# Patient Record
Sex: MALE | Race: WHITE | NOT HISPANIC OR LATINO | URBAN - METROPOLITAN AREA
[De-identification: names, ages, dates, MRNs, and addresses within clinical notes are randomized per-mention and may not be internally consistent; named-entity substitution may affect disease eponyms.]

---

## 2017-11-22 VITALS
OXYGEN SATURATION: 99 % | DIASTOLIC BLOOD PRESSURE: 72 MMHG | TEMPERATURE: 98 F | SYSTOLIC BLOOD PRESSURE: 103 MMHG | HEART RATE: 73 BPM | RESPIRATION RATE: 16 BRPM

## 2017-11-22 RX ORDER — ATORVASTATIN CALCIUM 80 MG/1
1 TABLET, FILM COATED ORAL
Qty: 0 | Refills: 0 | COMMUNITY

## 2017-11-22 RX ORDER — LIRAGLUTIDE 6 MG/ML
1.8 INJECTION SUBCUTANEOUS
Qty: 0 | Refills: 0 | COMMUNITY

## 2017-11-22 RX ORDER — INSULIN GLARGINE 100 [IU]/ML
0 INJECTION, SOLUTION SUBCUTANEOUS
Qty: 0 | Refills: 0 | COMMUNITY

## 2017-11-22 RX ORDER — LIRAGLUTIDE 6 MG/ML
0 INJECTION SUBCUTANEOUS
Qty: 0 | Refills: 0 | COMMUNITY

## 2017-11-22 NOTE — H&P ADULT - NSHPSOCIALHISTORY_GEN_ALL_CORE
FORMER SMOKER  Drinks alcohol socially. Denies elicit drug use. FORMER SMOKER (smoked 1-2 PPD for 30 years, quit in 1996)  Drinks alcohol socially. Denies elicit drug use.

## 2017-11-22 NOTE — H&P ADULT - PMH
Coronary artery disease involving native coronary artery of native heart without angina pectoris    Diabetes    Hypertension, unspecified type

## 2017-11-22 NOTE — H&P ADULT - ASSESSMENT
56 yo male former smoker with strong FHx and PMHx of known obstructive CAD s/p MI (in 1996 s/p angioplasty), s/p PCI (stent x2 to LAD, LCx-pt with stent cards- unable to obtain report as in archives) , HTN, NIDDM presented to Cardiologist Dr. Rosales for routine EST which was abnormal now referred for cardiac catheterization with possible intervention to r/o progressive CAD.     ASA II, Mallampati II  ASA 325mg and Plavix 600mg given pre-procedure  Risks & benefits of procedure and alternative therapy have been explained to the patient including but not limited to: allergic reaction, bleeding w/possible need for blood transfusion, infection, renal and vascular compromise, limb damage, arrhythmia, stroke, vessel dissection/perforation, Myocardial infarction, emergent CABG. Informed consent obtained and in chart.

## 2017-11-22 NOTE — H&P ADULT - PSH
History of cataract extraction, unspecified laterality  B/L in 2014  Presence of stent in coronary artery  x2 in 2003

## 2017-11-22 NOTE — H&P ADULT - HISTORY OF PRESENT ILLNESS
58 yo male former smoker with PMHx known obstructive CAD s/p MI (10/16/2003) with STENT, HTN, DM    EST on 11/14/17 revealed motion of inferior wall was hypokinetic at rest and improved after stress, motion of apical wall was normal at rest and hypokinetic after rest, motion of distal septal wall was normal at rest and hypokinetic after exercise, estimated baseline EF was 45%, EF decreased with stress, size of LV cavity increased with stress, mild to moderate amount of ischemia in the distributed of the LAD, mild MR at rest. **PATIENT TO BRING STENT CARDS    56 yo male former smoker with strong FHx and PMHx of known obstructive CAD s/p MI (in 1996 s/p angioplasty), s/p PCI x2 to LAD, LCx per pt report at [facility] in 10/16/2003 (unable to obtain report as in archives) , HTN, NIDDM presented to Cardiologist Dr. Rosales for routine EST on 11/14/17.   EST on 11/14/17 revealed 1.5 mm downsloping ST depression deyond baseline which resolved 6 minutes into recovery consistent with ischemia. Stress ECHO 11/14/17 revealed motion of inferior wall was hypokinetic at rest and improved after stress, motion of apical wall was normal at rest and hypokinetic after rest, motion of distal septal wall was normal at rest and hypokinetic after exercise, estimated baseline EF was 45%, EF decreased with stress, size of LV cavity increased with stress, mild to moderate amount of ischemia in the distributed of the LAD, mild MR at rest.  Denies SOB, dizziness, diaphoresis, palpitations, fatigue, LE edema, orthopnea, PND, syncope.     Due to pts risk factors, strong FHx, abnormal EST and stress ECHO, pt is referred for cardiac catheterization w/ possible intervention to r/o progressive CAD. **PATIENT TO BRING STENT CARDS    58 yo male former smoker with strong FHx and PMHx of known obstructive CAD s/p MI (in 1996 s/p angioplasty), s/p PCI (stent x2 to LAD, LCx per pt report at [facility] in 10/16/2003, unable to obtain report as in archives) , HTN, NIDDM presented to Cardiologist Dr. Rosales for routine EST on 11/14/17.   EST on 11/14/17 revealed 1.5 mm downsloping ST depression deyond baseline which resolved 6 minutes into recovery consistent with ischemia. Stress ECHO 11/14/17 revealed motion of inferior wall was hypokinetic at rest and improved after stress, motion of apical wall was normal at rest and hypokinetic after rest, motion of distal septal wall was normal at rest and hypokinetic after exercise, estimated baseline EF was 45%, EF decreased with stress, size of LV cavity increased with stress, mild to moderate amount of ischemia in the distributed of the LAD, mild MR at rest.  Denies SOB, dizziness, diaphoresis, palpitations, fatigue, LE edema, orthopnea, PND, syncope.     Due to pts risk factors, strong FHx, abnormal EST and stress ECHO, pt is referred for cardiac catheterization w/ possible intervention to r/o progressive CAD. 56 yo male former smoker with strong FHx and PMHx of known obstructive CAD s/p MI (in 1996 s/p angioplasty), s/p PCI (stent x2 to LAD, LCx-pt with stent cards- unable to obtain report as in archives) , HTN, NIDDM presented to Cardiologist Dr. Rosales for routine EST on 11/14/17.   EST on 11/14/17 revealed 1.5 mm downsloping ST depression deyond baseline which resolved 6 minutes into recovery consistent with ischemia. Stress ECHO 11/14/17 revealed motion of inferior wall was hypokinetic at rest and improved after stress, motion of apical wall was normal at rest and hypokinetic after rest, motion of distal septal wall was normal at rest and hypokinetic after exercise, estimated baseline EF was 45%, EF decreased with stress, size of LV cavity increased with stress, mild to moderate amount of ischemia in the distributed of the LAD, mild MR at rest.  Denies SOB, dizziness, diaphoresis, palpitations, fatigue, LE edema, orthopnea, PND, syncope.     Due to pts risk factors, strong FHx, abnormal EST and stress ECHO, pt is referred for cardiac catheterization w/ possible intervention to r/o progressive CAD.

## 2017-11-27 ENCOUNTER — INPATIENT (INPATIENT)
Facility: HOSPITAL | Age: 57
LOS: 0 days | Discharge: HOME | DRG: 247 | End: 2017-11-28
Attending: INTERNAL MEDICINE | Admitting: INTERNAL MEDICINE
Payer: COMMERCIAL

## 2017-11-27 DIAGNOSIS — Z95.5 PRESENCE OF CORONARY ANGIOPLASTY IMPLANT AND GRAFT: Chronic | ICD-10-CM

## 2017-11-27 DIAGNOSIS — Z98.49 CATARACT EXTRACTION STATUS, UNSPECIFIED EYE: Chronic | ICD-10-CM

## 2017-11-27 LAB — CK MB CFR SERPL CALC: 11.8 NG/ML — HIGH (ref 0–6.7)

## 2017-11-27 PROCEDURE — 99222 1ST HOSP IP/OBS MODERATE 55: CPT

## 2017-11-27 PROCEDURE — 93454 CORONARY ARTERY ANGIO S&I: CPT | Mod: 26,XU

## 2017-11-27 PROCEDURE — 93010 ELECTROCARDIOGRAM REPORT: CPT

## 2017-11-27 PROCEDURE — 92933 PRQ TRLML C ATHRC ST ANGIOP1: CPT | Mod: LD

## 2017-11-27 RX ORDER — SODIUM CHLORIDE 9 MG/ML
500 INJECTION INTRAMUSCULAR; INTRAVENOUS; SUBCUTANEOUS
Qty: 0 | Refills: 0 | Status: DISCONTINUED | OUTPATIENT
Start: 2017-11-27 | End: 2017-11-27

## 2017-11-27 RX ORDER — DEXTROSE 50 % IN WATER 50 %
12.5 SYRINGE (ML) INTRAVENOUS ONCE
Qty: 0 | Refills: 0 | Status: DISCONTINUED | OUTPATIENT
Start: 2017-11-27 | End: 2017-11-28

## 2017-11-27 RX ORDER — ATORVASTATIN CALCIUM 80 MG/1
40 TABLET, FILM COATED ORAL AT BEDTIME
Qty: 0 | Refills: 0 | Status: DISCONTINUED | OUTPATIENT
Start: 2017-11-27 | End: 2017-11-28

## 2017-11-27 RX ORDER — DEXTROSE 50 % IN WATER 50 %
25 SYRINGE (ML) INTRAVENOUS ONCE
Qty: 0 | Refills: 0 | Status: DISCONTINUED | OUTPATIENT
Start: 2017-11-27 | End: 2017-11-28

## 2017-11-27 RX ORDER — HYDROCHLOROTHIAZIDE 25 MG
25 TABLET ORAL DAILY
Qty: 0 | Refills: 0 | Status: DISCONTINUED | OUTPATIENT
Start: 2017-11-28 | End: 2017-11-28

## 2017-11-27 RX ORDER — ASPIRIN/CALCIUM CARB/MAGNESIUM 324 MG
81 TABLET ORAL DAILY
Qty: 0 | Refills: 0 | Status: DISCONTINUED | OUTPATIENT
Start: 2017-11-27 | End: 2017-11-28

## 2017-11-27 RX ORDER — INSULIN LISPRO 100/ML
VIAL (ML) SUBCUTANEOUS ONCE
Qty: 0 | Refills: 0 | Status: COMPLETED | OUTPATIENT
Start: 2017-11-27 | End: 2017-11-27

## 2017-11-27 RX ORDER — SODIUM CHLORIDE 9 MG/ML
1000 INJECTION, SOLUTION INTRAVENOUS
Qty: 0 | Refills: 0 | Status: DISCONTINUED | OUTPATIENT
Start: 2017-11-27 | End: 2017-11-28

## 2017-11-27 RX ORDER — INSULIN LISPRO 100/ML
VIAL (ML) SUBCUTANEOUS
Qty: 0 | Refills: 0 | Status: DISCONTINUED | OUTPATIENT
Start: 2017-11-27 | End: 2017-11-28

## 2017-11-27 RX ORDER — LOSARTAN POTASSIUM 100 MG/1
100 TABLET, FILM COATED ORAL DAILY
Qty: 0 | Refills: 0 | Status: DISCONTINUED | OUTPATIENT
Start: 2017-11-27 | End: 2017-11-28

## 2017-11-27 RX ORDER — CLOPIDOGREL BISULFATE 75 MG/1
75 TABLET, FILM COATED ORAL DAILY
Qty: 0 | Refills: 0 | Status: DISCONTINUED | OUTPATIENT
Start: 2017-11-28 | End: 2017-11-28

## 2017-11-27 RX ORDER — GLUCAGON INJECTION, SOLUTION 0.5 MG/.1ML
1 INJECTION, SOLUTION SUBCUTANEOUS ONCE
Qty: 0 | Refills: 0 | Status: DISCONTINUED | OUTPATIENT
Start: 2017-11-27 | End: 2017-11-28

## 2017-11-27 RX ORDER — CLOPIDOGREL BISULFATE 75 MG/1
600 TABLET, FILM COATED ORAL ONCE
Qty: 0 | Refills: 0 | Status: COMPLETED | OUTPATIENT
Start: 2017-11-27 | End: 2017-11-27

## 2017-11-27 RX ORDER — CHLORHEXIDINE GLUCONATE 213 G/1000ML
1 SOLUTION TOPICAL ONCE
Qty: 0 | Refills: 0 | Status: DISCONTINUED | OUTPATIENT
Start: 2017-11-27 | End: 2017-11-27

## 2017-11-27 RX ORDER — ASPIRIN/CALCIUM CARB/MAGNESIUM 324 MG
325 TABLET ORAL ONCE
Qty: 0 | Refills: 0 | Status: COMPLETED | OUTPATIENT
Start: 2017-11-27 | End: 2017-11-27

## 2017-11-27 RX ORDER — DEXTROSE 50 % IN WATER 50 %
1 SYRINGE (ML) INTRAVENOUS ONCE
Qty: 0 | Refills: 0 | Status: DISCONTINUED | OUTPATIENT
Start: 2017-11-27 | End: 2017-11-28

## 2017-11-27 RX ORDER — METOPROLOL TARTRATE 50 MG
100 TABLET ORAL
Qty: 0 | Refills: 0 | Status: DISCONTINUED | OUTPATIENT
Start: 2017-11-27 | End: 2017-11-28

## 2017-11-27 RX ADMIN — Medication 6: at 22:03

## 2017-11-27 RX ADMIN — Medication: at 12:57

## 2017-11-27 RX ADMIN — SODIUM CHLORIDE 75 MILLILITER(S): 9 INJECTION INTRAMUSCULAR; INTRAVENOUS; SUBCUTANEOUS at 12:51

## 2017-11-27 RX ADMIN — Medication 325 MILLIGRAM(S): at 12:50

## 2017-11-27 RX ADMIN — ATORVASTATIN CALCIUM 40 MILLIGRAM(S): 80 TABLET, FILM COATED ORAL at 22:04

## 2017-11-27 RX ADMIN — Medication: at 16:30

## 2017-11-27 RX ADMIN — CLOPIDOGREL BISULFATE 600 MILLIGRAM(S): 75 TABLET, FILM COATED ORAL at 12:51

## 2017-11-27 RX ADMIN — SODIUM CHLORIDE 75 MILLILITER(S): 9 INJECTION INTRAMUSCULAR; INTRAVENOUS; SUBCUTANEOUS at 20:33

## 2017-11-27 RX ADMIN — Medication 100 MILLIGRAM(S): at 19:26

## 2017-11-28 VITALS — TEMPERATURE: 98 F

## 2017-11-28 LAB
ANION GAP SERPL CALC-SCNC: 14 MMOL/L — SIGNIFICANT CHANGE UP (ref 5–17)
BASOPHILS NFR BLD AUTO: 0.4 % — SIGNIFICANT CHANGE UP (ref 0–2)
BUN SERPL-MCNC: 20 MG/DL — SIGNIFICANT CHANGE UP (ref 7–23)
CALCIUM SERPL-MCNC: 9.1 MG/DL — SIGNIFICANT CHANGE UP (ref 8.4–10.5)
CHLORIDE SERPL-SCNC: 100 MMOL/L — SIGNIFICANT CHANGE UP (ref 96–108)
CO2 SERPL-SCNC: 24 MMOL/L — SIGNIFICANT CHANGE UP (ref 22–31)
CREAT SERPL-MCNC: 1.12 MG/DL — SIGNIFICANT CHANGE UP (ref 0.5–1.3)
EOSINOPHIL NFR BLD AUTO: 2.9 % — SIGNIFICANT CHANGE UP (ref 0–6)
GLUCOSE SERPL-MCNC: 196 MG/DL — HIGH (ref 70–99)
HCT VFR BLD CALC: 32.3 % — LOW (ref 39–50)
HGB BLD-MCNC: 11.3 G/DL — LOW (ref 13–17)
LYMPHOCYTES # BLD AUTO: 22.4 % — SIGNIFICANT CHANGE UP (ref 13–44)
MAGNESIUM SERPL-MCNC: 1.4 MG/DL — LOW (ref 1.6–2.6)
MCHC RBC-ENTMCNC: 29 PG — SIGNIFICANT CHANGE UP (ref 27–34)
MCHC RBC-ENTMCNC: 35 G/DL — SIGNIFICANT CHANGE UP (ref 32–36)
MCV RBC AUTO: 82.8 FL — SIGNIFICANT CHANGE UP (ref 80–100)
MONOCYTES NFR BLD AUTO: 8.7 % — SIGNIFICANT CHANGE UP (ref 2–14)
NEUTROPHILS NFR BLD AUTO: 65.6 % — SIGNIFICANT CHANGE UP (ref 43–77)
PLATELET # BLD AUTO: 164 K/UL — SIGNIFICANT CHANGE UP (ref 150–400)
POTASSIUM SERPL-MCNC: 4 MMOL/L — SIGNIFICANT CHANGE UP (ref 3.5–5.3)
POTASSIUM SERPL-SCNC: 4 MMOL/L — SIGNIFICANT CHANGE UP (ref 3.5–5.3)
RBC # BLD: 3.9 M/UL — LOW (ref 4.2–5.8)
RBC # FLD: 13.1 % — SIGNIFICANT CHANGE UP (ref 10.3–16.9)
SODIUM SERPL-SCNC: 138 MMOL/L — SIGNIFICANT CHANGE UP (ref 135–145)
WBC # BLD: 8.4 K/UL — SIGNIFICANT CHANGE UP (ref 3.8–10.5)
WBC # FLD AUTO: 8.4 K/UL — SIGNIFICANT CHANGE UP (ref 3.8–10.5)

## 2017-11-28 PROCEDURE — 99239 HOSP IP/OBS DSCHRG MGMT >30: CPT

## 2017-11-28 RX ORDER — ASPIRIN/CALCIUM CARB/MAGNESIUM 324 MG
1 TABLET ORAL
Qty: 30 | Refills: 11 | OUTPATIENT
Start: 2017-11-28 | End: 2018-11-22

## 2017-11-28 RX ORDER — CLOPIDOGREL BISULFATE 75 MG/1
1 TABLET, FILM COATED ORAL
Qty: 30 | Refills: 11 | OUTPATIENT
Start: 2017-11-28 | End: 2018-11-22

## 2017-11-28 RX ORDER — MAGNESIUM SULFATE 500 MG/ML
2 VIAL (ML) INJECTION ONCE
Qty: 0 | Refills: 0 | Status: COMPLETED | OUTPATIENT
Start: 2017-11-28 | End: 2017-11-28

## 2017-11-28 RX ORDER — LOSARTAN POTASSIUM 100 MG/1
1 TABLET, FILM COATED ORAL
Qty: 0 | Refills: 0 | COMMUNITY

## 2017-11-28 RX ORDER — ASPIRIN/CALCIUM CARB/MAGNESIUM 324 MG
1 TABLET ORAL
Qty: 0 | Refills: 0 | COMMUNITY

## 2017-11-28 RX ORDER — METOPROLOL TARTRATE 50 MG
1 TABLET ORAL
Qty: 60 | Refills: 0 | OUTPATIENT
Start: 2017-11-28 | End: 2017-12-28

## 2017-11-28 RX ORDER — ATORVASTATIN CALCIUM 80 MG/1
1 TABLET, FILM COATED ORAL
Qty: 0 | Refills: 0 | COMMUNITY

## 2017-11-28 RX ORDER — LOSARTAN POTASSIUM 100 MG/1
1 TABLET, FILM COATED ORAL
Qty: 30 | Refills: 0 | OUTPATIENT
Start: 2017-11-28 | End: 2017-12-28

## 2017-11-28 RX ORDER — ATORVASTATIN CALCIUM 80 MG/1
1 TABLET, FILM COATED ORAL
Qty: 30 | Refills: 0 | OUTPATIENT
Start: 2017-11-28 | End: 2017-12-28

## 2017-11-28 RX ORDER — METOPROLOL TARTRATE 50 MG
1 TABLET ORAL
Qty: 0 | Refills: 0 | COMMUNITY

## 2017-11-28 RX ADMIN — LOSARTAN POTASSIUM 100 MILLIGRAM(S): 100 TABLET, FILM COATED ORAL at 05:52

## 2017-11-28 RX ADMIN — Medication 8: at 11:22

## 2017-11-28 RX ADMIN — Medication 2: at 07:39

## 2017-11-28 RX ADMIN — Medication 50 GRAM(S): at 09:09

## 2017-11-28 RX ADMIN — Medication 81 MILLIGRAM(S): at 11:22

## 2017-11-28 RX ADMIN — Medication 100 MILLIGRAM(S): at 05:52

## 2017-11-28 RX ADMIN — CLOPIDOGREL BISULFATE 75 MILLIGRAM(S): 75 TABLET, FILM COATED ORAL at 11:22

## 2017-11-28 RX ADMIN — Medication 25 MILLIGRAM(S): at 05:52

## 2017-11-28 NOTE — DISCHARGE NOTE ADULT - PATIENT PORTAL LINK FT
“You can access the FollowHealth Patient Portal, offered by [facility], by registering with the following website: [email or web address]”

## 2017-11-28 NOTE — DISCHARGE NOTE ADULT - HOSPITAL COURSE
57 cad dm2 positive stress test on exam, came for cath -->got an atherectomy, got a HASEEB to pLAD. FINDINGS of cath: previously placed  mLAD stent and LCX stent patent. mRCA 70-80% stenosed. dRCA 80% stenosed.  Got 160cc contrast.   Rradial band removed.   Return in 5 weeks for PCI of RCA with dr. tay. Patient should call office to make this appointment.  Follow up with Dr. Monika Keenan in 1-2 weeks.       aspirin/plavix prescriptions already put in discharge meds, on [facility] as an outpatient.   Can call [phone] with questions.    O/N: LOREN    CATH 11/27: Successful CSI Orbital Atherectomy and HASEEB prox LAD; patent stents mLAD and LCx; mRCA 70-80%, dRCA 80%,  Rt Radial off. 160cc dye. ASA/Plavix. Return for staged PCI of RCA in 5 weeks w/Dr Tay. F/U with Dr Jeovanny Rosales. Patient is a 57 gentleman with CAD,  DM2 and a positive stress test on exam who presented  for planned catheterization. Patient presented and underwent successful atherectomy and a HASEEB was deployed to the pLAD.     Findings of her catheterization: previously placed  mLAD stent and LCX stent patent. mRCA 70-80% stenosed. dRCA 80% stenosed.  Got 160cc contrast.   Rradial band removed.   Return in 5 weeks for PCI of RCA with dr. tay. Patient should call office to make this appointment.  Follow up with Dr. Monika Keenan in 1-2 weeks.       aspirin/plavix prescriptions already put in discharge meds, on [facility] as an outpatient.   Can call [phone] with questions.    O/N: LOREN    CATH 11/27: Successful CSI Orbital Atherectomy and HASEEB prox LAD; patent stents mLAD and LCx; mRCA 70-80%, dRCA 80%,  Rt Radial off. 160cc dye. ASA/Plavix. Return for staged PCI of RCA in 5 weeks w/Dr Tay. F/U with Dr Jeovanny Rosales. Patient is a 57 gentleman with CAD,  DM2 and a positive stress test on exam who presented  for planned catheterization. Patient presented and underwent successful atherectomy and a HASEEB was deployed to the pLAD.     Findings of her catheterization: previously placed  mLAD stent and LCX stent patent. mRCA 70-80% stenosed. dRCA 80% stenosed.  Got 160cc contrast.  Rradial band removed.     Patient to return in 5 weeks for PCI of RCA with Dr. Vincent. Patient should call office to make this appointment.   Follow up with Dr. Monika Keenan in 1-2 weeks.

## 2017-11-28 NOTE — DISCHARGE NOTE ADULT - MEDICATION SUMMARY - MEDICATIONS TO TAKE
I will START or STAY ON the medications listed below when I get home from the hospital:    Aspirin Enteric Coated 81 mg oral delayed release tablet  -- 1 tab(s) by mouth once a day  -- Indication: For CAD    losartan 100 mg oral tablet  -- 1 tab(s) by mouth once a day  -- Indication: For CAD    glipiZIDE 10 mg oral tablet  -- 1 tab(s) by mouth 2 times a day before meals  -- Indication: For Diabetes    Victoza 18 mg/3 mL subcutaneous solution  -- 1.8 milligram(s) subcutaneous once a day  -- Indication: For Diabetes    metFORMIN 1000 mg oral tablet  -- 1 tab(s) by mouth 2 times a day  -- Indication: For Diabetes    Lipitor 40 mg oral tablet  -- 1 tab(s) by mouth once a day  -- Indication: For CAD    clopidogrel 75 mg oral tablet  -- 1 tab(s) by mouth once a day  -- Indication: For CAD    Metoprolol Tartrate 100 mg oral tablet  -- 1 tab(s) by mouth 2 times a day  -- Indication: For CAD    hydroCHLOROthiazide 25 mg oral tablet  -- 1 tab(s) by mouth once a day  -- Indication: For Hypertension, unspecified type

## 2017-11-28 NOTE — DISCHARGE NOTE ADULT - CARE PROVIDERS DIRECT ADDRESSES
[email or web address]

## 2017-11-28 NOTE — DISCHARGE NOTE ADULT - CARE PROVIDER_API CALL
Jeovanny Rosales), Internal Medicine  [facility] [place] [address]  Phone: [phone]  Fax: [phone]    Alex Vincent), Cardiology; Interventional Cardiology  [address] [address]  Phone: [phone]  Fax: [phone]

## 2017-11-28 NOTE — DISCHARGE NOTE ADULT - CARE PLAN
Principal Discharge DX:	Coronary artery disease involving native coronary artery of native heart without angina pectoris Principal Discharge DX:	Coronary artery disease involving native coronary artery of native heart without angina pectoris  Instructions for follow-up, activity and diet:	You presented to the hospital and had a drug eluting stent placed in your proximal LAD artery in your heart. The procedure was successful. Cardiac imaging indicated that you also have a blockage in another artery called your RCA. You should return for intervention with Dr. Vincent in five weeks. In the meantime, please follow up with your cardiologist, Dr. Rosales on Monday december 4 @2:00PM. Principal Discharge DX:	Coronary artery disease involving native coronary artery of native heart without angina pectoris  Goal:	To reduce risk of future cardiac events  Instructions for follow-up, activity and diet:	You presented to the hospital and had a drug eluting stent placed in your proximal LAD artery in your heart. The procedure was successful. Cardiac imaging indicated that you also have a blockage in another artery called your RCA. You should return for intervention with Dr. Vincent in five weeks. In the meantime, please follow up with your cardiologist, Dr. Rosales on Monday december 4 @2:00PM.

## 2017-11-28 NOTE — DISCHARGE NOTE ADULT - PLAN OF CARE
You presented to the hospital and had a drug eluting stent placed in your proximal LAD artery in your heart. The procedure was successful. Cardiac imaging indicated that you also have a blockage in another artery called your RCA. You should return for intervention with Dr. Vincent in five weeks. In the meantime, please follow up with your cardiologist, Dr. Rosales on Monday december 4 @2:00PM. To reduce risk of future cardiac events

## 2017-11-28 NOTE — DISCHARGE NOTE ADULT - OTHER SIGNIFICANT FINDINGS
CATH 11/27: Successful CSI Orbital Atherectomy and HASEEB prox LAD; patent stents mLAD and LCx; mRCA 70-80%, dRCA 80%,  Rt Radial off. 160cc dye. ASA/Plavix. Return for staged PCI of RCA in 5 weeks w/Dr Vincent. F/U with Dr Jeovanny Rosales.

## 2017-11-30 DIAGNOSIS — E78.5 HYPERLIPIDEMIA, UNSPECIFIED: ICD-10-CM

## 2017-11-30 DIAGNOSIS — I25.10 ATHEROSCLEROTIC HEART DISEASE OF NATIVE CORONARY ARTERY WITHOUT ANGINA PECTORIS: ICD-10-CM

## 2017-11-30 DIAGNOSIS — E11.9 TYPE 2 DIABETES MELLITUS WITHOUT COMPLICATIONS: ICD-10-CM

## 2017-12-01 PROCEDURE — 85025 COMPLETE CBC W/AUTO DIFF WBC: CPT

## 2017-12-01 PROCEDURE — 82553 CREATINE MB FRACTION: CPT

## 2017-12-01 PROCEDURE — 36415 COLL VENOUS BLD VENIPUNCTURE: CPT

## 2017-12-01 PROCEDURE — 82962 GLUCOSE BLOOD TEST: CPT

## 2017-12-01 PROCEDURE — C1725: CPT

## 2017-12-01 PROCEDURE — 83036 HEMOGLOBIN GLYCOSYLATED A1C: CPT

## 2017-12-01 PROCEDURE — C1724: CPT

## 2017-12-01 PROCEDURE — 80061 LIPID PANEL: CPT

## 2017-12-01 PROCEDURE — 85610 PROTHROMBIN TIME: CPT

## 2017-12-01 PROCEDURE — 80053 COMPREHEN METABOLIC PANEL: CPT

## 2017-12-01 PROCEDURE — C1887: CPT

## 2017-12-01 PROCEDURE — 85730 THROMBOPLASTIN TIME PARTIAL: CPT

## 2017-12-01 PROCEDURE — C1769: CPT

## 2017-12-01 PROCEDURE — C1874: CPT

## 2017-12-01 PROCEDURE — 83735 ASSAY OF MAGNESIUM: CPT

## 2017-12-01 PROCEDURE — 80048 BASIC METABOLIC PNL TOTAL CA: CPT

## 2017-12-01 PROCEDURE — 82550 ASSAY OF CK (CPK): CPT

## 2017-12-01 PROCEDURE — 93005 ELECTROCARDIOGRAM TRACING: CPT

## 2017-12-04 DIAGNOSIS — Z28.21 IMMUNIZATION NOT CARRIED OUT BECAUSE OF PATIENT REFUSAL: ICD-10-CM

## 2017-12-04 DIAGNOSIS — Z95.5 PRESENCE OF CORONARY ANGIOPLASTY IMPLANT AND GRAFT: ICD-10-CM

## 2017-12-21 PROBLEM — E11.9 TYPE 2 DIABETES MELLITUS WITHOUT COMPLICATIONS: Chronic | Status: ACTIVE | Noted: 2017-11-22

## 2017-12-21 PROBLEM — I10 ESSENTIAL (PRIMARY) HYPERTENSION: Chronic | Status: ACTIVE | Noted: 2017-11-22

## 2017-12-21 PROBLEM — I25.10 ATHEROSCLEROTIC HEART DISEASE OF NATIVE CORONARY ARTERY WITHOUT ANGINA PECTORIS: Chronic | Status: ACTIVE | Noted: 2017-11-22

## 2018-01-17 VITALS
DIASTOLIC BLOOD PRESSURE: 66 MMHG | OXYGEN SATURATION: 100 % | WEIGHT: 238.1 LBS | SYSTOLIC BLOOD PRESSURE: 140 MMHG | HEART RATE: 64 BPM | RESPIRATION RATE: 18 BRPM | HEIGHT: 71 IN

## 2018-01-17 NOTE — H&P ADULT - HISTORY OF PRESENT ILLNESS
56 y/o M, former smoker, with PMHx HTN, HLD, DM, CAD s/p multiple PCIs (most recently @[facility] 11/27/17 receiving HASEEB to proxLAD), who now presents for staged PCI. He initially presented to his cardiologist for follow-up and was found to have abnormal exercise stress test and stress echo. Estimated baseline EF 45% by stress echo 11/14/17. He underwent cardiac cath 11/27/17: s/p successful CSI Orbital Atherectomy and HASEEB to proxLAD; patent prior stents in mLAD and LCx; mRCA 70-80%, dRCA 80%, to return for staged PCI of RCA. Since his last procedure, he reports? He denies? He was discharged on Aspirin and Plavix and reports daily compliance. In light of patient’s risk factors, and known residual disease, patient is recommended for staged PCI of RCA. 56 y/o M, former smoker, with PMHx HTN, HLD, DM, CAD s/p multiple PCIs (most recently @[facility] 11/27/17 receiving HASEEB to proxLAD), who now presents for staged PCI. He initially presented to his cardiologist for follow-up and was found to have abnormal exercise stress test and stress echo. Estimated baseline EF 45% by stress echo 11/14/17. He underwent cardiac cath 11/27/17: s/p successful CSI Orbital Atherectomy and HASEEB to proxLAD; patent prior stents in mLAD and LCx; mRCA 70-80%, dRCA 80%, to return for staged PCI of RCA. Since his last procedure, he reports feeling well. He denies chest pain, SOB, palpitations, n/v, diaphoresis, LE edema, orthopnea, or syncope. He was discharged on Aspirin and Plavix and reports daily compliance. In light of patient’s risk factors and known residual disease, patient now returns for recommended staged PCI of RCA.

## 2018-01-17 NOTE — H&P ADULT - NSHPSOCIALHISTORY_GEN_ALL_CORE
FORMER SMOKER (smoked 1-2 PPD for 30 years, quit in 1996)  Drinks alcohol socially. Denies elicit drug use.

## 2018-01-17 NOTE — H&P ADULT - PMH
Coronary artery disease involving native coronary artery of native heart without angina pectoris    Diabetes    HLD (hyperlipidemia)    Hypertension, unspecified type

## 2018-01-17 NOTE — H&P ADULT - ASSESSMENT
56 y/o M, former smoker, with PMHx HTN, HLD, DM, CAD s/p multiple PCIs (most recently [facility] 11/27/17 receiving HASEEB to proxLAD), who presents today  for staged PCI of known residual RCA lesion        ASA: III and Mallampati: III     Precath/Consented   IVF Hydration NS @ 75cc/hr   Pt took their  daily dose of ASA/Plavix 56 y/o M, former smoker, with PMHx HTN, HLD, DM, CAD s/p multiple PCIs (most recently @[facility] 11/27/17 receiving HASEEB to proxLAD), who presents today  for staged PCI of known residual RCA lesion        ASA: III and Mallampati: III     -Precath/Consented   -IVF Hydration NS @ 75cc/hr   -ASA 325mg PO x 1 and Plavix 75mg PO x 1 preprocedure

## 2018-01-19 ENCOUNTER — INPATIENT (INPATIENT)
Facility: HOSPITAL | Age: 58
LOS: 0 days | Discharge: HOME | DRG: 247 | End: 2018-01-20
Attending: INTERNAL MEDICINE | Admitting: INTERNAL MEDICINE
Payer: COMMERCIAL

## 2018-01-19 DIAGNOSIS — Z95.5 PRESENCE OF CORONARY ANGIOPLASTY IMPLANT AND GRAFT: Chronic | ICD-10-CM

## 2018-01-19 DIAGNOSIS — Z98.49 CATARACT EXTRACTION STATUS, UNSPECIFIED EYE: Chronic | ICD-10-CM

## 2018-01-19 LAB
ALBUMIN SERPL ELPH-MCNC: 4.4 G/DL — SIGNIFICANT CHANGE UP (ref 3.3–5)
ALP SERPL-CCNC: 74 U/L — SIGNIFICANT CHANGE UP (ref 40–120)
ALT FLD-CCNC: 22 U/L — SIGNIFICANT CHANGE UP (ref 10–45)
ANION GAP SERPL CALC-SCNC: 12 MMOL/L — SIGNIFICANT CHANGE UP (ref 5–17)
APTT BLD: 30.2 SEC — SIGNIFICANT CHANGE UP (ref 27.5–37.4)
AST SERPL-CCNC: 24 U/L — SIGNIFICANT CHANGE UP (ref 10–40)
BASOPHILS NFR BLD AUTO: 0.6 % — SIGNIFICANT CHANGE UP (ref 0–2)
BILIRUB SERPL-MCNC: 0.6 MG/DL — SIGNIFICANT CHANGE UP (ref 0.2–1.2)
BUN SERPL-MCNC: 23 MG/DL — SIGNIFICANT CHANGE UP (ref 7–23)
CALCIUM SERPL-MCNC: 9.8 MG/DL — SIGNIFICANT CHANGE UP (ref 8.4–10.5)
CHLORIDE SERPL-SCNC: 96 MMOL/L — SIGNIFICANT CHANGE UP (ref 96–108)
CHOLEST SERPL-MCNC: 130 MG/DL — SIGNIFICANT CHANGE UP (ref 10–199)
CO2 SERPL-SCNC: 26 MMOL/L — SIGNIFICANT CHANGE UP (ref 22–31)
CREAT SERPL-MCNC: 1.05 MG/DL — SIGNIFICANT CHANGE UP (ref 0.5–1.3)
CRP SERPL-MCNC: 0.92 MG/DL — HIGH (ref 0–0.4)
EOSINOPHIL NFR BLD AUTO: 2 % — SIGNIFICANT CHANGE UP (ref 0–6)
GLUCOSE SERPL-MCNC: 390 MG/DL — HIGH (ref 70–99)
HBA1C BLD-MCNC: 8.2 % — HIGH (ref 4–5.6)
HCT VFR BLD CALC: 34.1 % — LOW (ref 39–50)
HDLC SERPL-MCNC: 39 MG/DL — LOW (ref 40–125)
HGB BLD-MCNC: 12.1 G/DL — LOW (ref 13–17)
INR BLD: 0.98 — SIGNIFICANT CHANGE UP (ref 0.88–1.16)
LIPID PNL WITH DIRECT LDL SERPL: 73 MG/DL — SIGNIFICANT CHANGE UP
LYMPHOCYTES # BLD AUTO: 18.7 % — SIGNIFICANT CHANGE UP (ref 13–44)
MCHC RBC-ENTMCNC: 29.4 PG — SIGNIFICANT CHANGE UP (ref 27–34)
MCHC RBC-ENTMCNC: 35.5 G/DL — SIGNIFICANT CHANGE UP (ref 32–36)
MCV RBC AUTO: 83 FL — SIGNIFICANT CHANGE UP (ref 80–100)
MONOCYTES NFR BLD AUTO: 7.1 % — SIGNIFICANT CHANGE UP (ref 2–14)
NEUTROPHILS NFR BLD AUTO: 71.6 % — SIGNIFICANT CHANGE UP (ref 43–77)
PLATELET # BLD AUTO: 202 K/UL — SIGNIFICANT CHANGE UP (ref 150–400)
POTASSIUM SERPL-MCNC: 4.3 MMOL/L — SIGNIFICANT CHANGE UP (ref 3.5–5.3)
POTASSIUM SERPL-SCNC: 4.3 MMOL/L — SIGNIFICANT CHANGE UP (ref 3.5–5.3)
PROT SERPL-MCNC: 7.9 G/DL — SIGNIFICANT CHANGE UP (ref 6–8.3)
PROTHROM AB SERPL-ACNC: 10.9 SEC — SIGNIFICANT CHANGE UP (ref 9.8–12.7)
RBC # BLD: 4.11 M/UL — LOW (ref 4.2–5.8)
RBC # FLD: 13.1 % — SIGNIFICANT CHANGE UP (ref 10.3–16.9)
SODIUM SERPL-SCNC: 134 MMOL/L — LOW (ref 135–145)
TOTAL CHOLESTEROL/HDL RATIO MEASUREMENT: 3.3 RATIO — LOW (ref 3.4–9.6)
TRIGL SERPL-MCNC: 88 MG/DL — SIGNIFICANT CHANGE UP (ref 10–149)
WBC # BLD: 7.8 K/UL — SIGNIFICANT CHANGE UP (ref 3.8–10.5)
WBC # FLD AUTO: 7.8 K/UL — SIGNIFICANT CHANGE UP (ref 3.8–10.5)

## 2018-01-19 PROCEDURE — 92933 PRQ TRLML C ATHRC ST ANGIOP1: CPT | Mod: RC

## 2018-01-19 PROCEDURE — 93454 CORONARY ARTERY ANGIO S&I: CPT | Mod: 26,XU

## 2018-01-19 RX ORDER — DEXTROSE 50 % IN WATER 50 %
1 SYRINGE (ML) INTRAVENOUS ONCE
Qty: 0 | Refills: 0 | Status: DISCONTINUED | OUTPATIENT
Start: 2018-01-19 | End: 2018-01-20

## 2018-01-19 RX ORDER — SODIUM CHLORIDE 9 MG/ML
500 INJECTION INTRAMUSCULAR; INTRAVENOUS; SUBCUTANEOUS
Qty: 0 | Refills: 0 | Status: DISCONTINUED | OUTPATIENT
Start: 2018-01-19 | End: 2018-01-19

## 2018-01-19 RX ORDER — ASPIRIN/CALCIUM CARB/MAGNESIUM 324 MG
81 TABLET ORAL DAILY
Qty: 0 | Refills: 0 | Status: DISCONTINUED | OUTPATIENT
Start: 2018-01-20 | End: 2018-01-20

## 2018-01-19 RX ORDER — CLOPIDOGREL BISULFATE 75 MG/1
75 TABLET, FILM COATED ORAL ONCE
Qty: 0 | Refills: 0 | Status: COMPLETED | OUTPATIENT
Start: 2018-01-19 | End: 2018-01-19

## 2018-01-19 RX ORDER — HYDROCHLOROTHIAZIDE 25 MG
25 TABLET ORAL DAILY
Qty: 0 | Refills: 0 | Status: DISCONTINUED | OUTPATIENT
Start: 2018-01-20 | End: 2018-01-20

## 2018-01-19 RX ORDER — INSULIN LISPRO 100/ML
VIAL (ML) SUBCUTANEOUS AT BEDTIME
Qty: 0 | Refills: 0 | Status: DISCONTINUED | OUTPATIENT
Start: 2018-01-19 | End: 2018-01-20

## 2018-01-19 RX ORDER — CHLORHEXIDINE GLUCONATE 213 G/1000ML
1 SOLUTION TOPICAL ONCE
Qty: 0 | Refills: 0 | Status: DISCONTINUED | OUTPATIENT
Start: 2018-01-19 | End: 2018-01-19

## 2018-01-19 RX ORDER — INSULIN LISPRO 100/ML
VIAL (ML) SUBCUTANEOUS
Qty: 0 | Refills: 0 | Status: DISCONTINUED | OUTPATIENT
Start: 2018-01-19 | End: 2018-01-20

## 2018-01-19 RX ORDER — ASPIRIN/CALCIUM CARB/MAGNESIUM 324 MG
325 TABLET ORAL ONCE
Qty: 0 | Refills: 0 | Status: COMPLETED | OUTPATIENT
Start: 2018-01-19 | End: 2018-01-19

## 2018-01-19 RX ORDER — ATORVASTATIN CALCIUM 80 MG/1
40 TABLET, FILM COATED ORAL AT BEDTIME
Qty: 0 | Refills: 0 | Status: DISCONTINUED | OUTPATIENT
Start: 2018-01-19 | End: 2018-01-20

## 2018-01-19 RX ORDER — LOSARTAN POTASSIUM 100 MG/1
100 TABLET, FILM COATED ORAL DAILY
Qty: 0 | Refills: 0 | Status: DISCONTINUED | OUTPATIENT
Start: 2018-01-20 | End: 2018-01-20

## 2018-01-19 RX ORDER — CLOPIDOGREL BISULFATE 75 MG/1
75 TABLET, FILM COATED ORAL DAILY
Qty: 0 | Refills: 0 | Status: DISCONTINUED | OUTPATIENT
Start: 2018-01-20 | End: 2018-01-20

## 2018-01-19 RX ORDER — SODIUM CHLORIDE 9 MG/ML
1000 INJECTION, SOLUTION INTRAVENOUS
Qty: 0 | Refills: 0 | Status: DISCONTINUED | OUTPATIENT
Start: 2018-01-19 | End: 2018-01-20

## 2018-01-19 RX ORDER — INSULIN LISPRO 100/ML
VIAL (ML) SUBCUTANEOUS ONCE
Qty: 0 | Refills: 0 | Status: COMPLETED | OUTPATIENT
Start: 2018-01-19 | End: 2018-01-19

## 2018-01-19 RX ORDER — SODIUM CHLORIDE 9 MG/ML
500 INJECTION INTRAMUSCULAR; INTRAVENOUS; SUBCUTANEOUS
Qty: 0 | Refills: 0 | Status: DISCONTINUED | OUTPATIENT
Start: 2018-01-19 | End: 2018-01-20

## 2018-01-19 RX ORDER — CLOPIDOGREL BISULFATE 75 MG/1
600 TABLET, FILM COATED ORAL ONCE
Qty: 0 | Refills: 0 | Status: DISCONTINUED | OUTPATIENT
Start: 2018-01-19 | End: 2018-01-19

## 2018-01-19 RX ORDER — DEXTROSE 50 % IN WATER 50 %
25 SYRINGE (ML) INTRAVENOUS ONCE
Qty: 0 | Refills: 0 | Status: DISCONTINUED | OUTPATIENT
Start: 2018-01-19 | End: 2018-01-20

## 2018-01-19 RX ORDER — GLUCAGON INJECTION, SOLUTION 0.5 MG/.1ML
1 INJECTION, SOLUTION SUBCUTANEOUS ONCE
Qty: 0 | Refills: 0 | Status: DISCONTINUED | OUTPATIENT
Start: 2018-01-19 | End: 2018-01-20

## 2018-01-19 RX ORDER — METOPROLOL TARTRATE 50 MG
100 TABLET ORAL EVERY 12 HOURS
Qty: 0 | Refills: 0 | Status: DISCONTINUED | OUTPATIENT
Start: 2018-01-19 | End: 2018-01-20

## 2018-01-19 RX ORDER — DEXTROSE 50 % IN WATER 50 %
12.5 SYRINGE (ML) INTRAVENOUS ONCE
Qty: 0 | Refills: 0 | Status: DISCONTINUED | OUTPATIENT
Start: 2018-01-19 | End: 2018-01-20

## 2018-01-19 RX ADMIN — SODIUM CHLORIDE 75 MILLILITER(S): 9 INJECTION INTRAMUSCULAR; INTRAVENOUS; SUBCUTANEOUS at 12:52

## 2018-01-19 RX ADMIN — CLOPIDOGREL BISULFATE 75 MILLIGRAM(S): 75 TABLET, FILM COATED ORAL at 13:09

## 2018-01-19 RX ADMIN — Medication 325 MILLIGRAM(S): at 13:09

## 2018-01-19 RX ADMIN — Medication 10: at 13:20

## 2018-01-19 RX ADMIN — Medication 2: at 22:42

## 2018-01-19 RX ADMIN — Medication 100 MILLIGRAM(S): at 18:04

## 2018-01-19 RX ADMIN — ATORVASTATIN CALCIUM 40 MILLIGRAM(S): 80 TABLET, FILM COATED ORAL at 22:42

## 2018-01-19 RX ADMIN — SODIUM CHLORIDE 75 MILLILITER(S): 9 INJECTION INTRAMUSCULAR; INTRAVENOUS; SUBCUTANEOUS at 16:21

## 2018-01-19 RX ADMIN — SODIUM CHLORIDE 75 MILLILITER(S): 9 INJECTION INTRAMUSCULAR; INTRAVENOUS; SUBCUTANEOUS at 13:09

## 2018-01-19 RX ADMIN — Medication 4: at 16:21

## 2018-01-19 NOTE — CHART NOTE - NSCHARTNOTEFT_GEN_A_CORE
Interventional Cardiology Event Note    MELANY Wray called to bedside when pt was found to have ST elevations in V1-V3. Pt asymptomatic. BP 140s/70s. Dr. Vincent was contacted and stated perfusion to the RV was temporarily decreased during the procedure and pt developed ST elevations during procedure and he was not concerned. He stated if pt was hypotensive then there would be concern for RV infarct but as pt is normotensive and asymptomatic he is not concerned. Amish, the interventional cardiology fellow came and evaluated the pt and the CCU fellows came and evaluated the pt as well. Pt remains stable and asymptomatic.

## 2018-01-19 NOTE — PROVIDER CONTACT NOTE (CHANGE IN STATUS NOTIFICATION) - ASSESSMENT
Patient asymptomatic in bed  talking to his visitor with no cardiac complaints, vital signs remain stable, IV hydration at 75 cc/hr for 4 hours, both procedure sites no bleeding or hematoma

## 2018-01-20 VITALS — WEIGHT: 236.34 LBS

## 2018-01-20 LAB
ANION GAP SERPL CALC-SCNC: 12 MMOL/L — SIGNIFICANT CHANGE UP (ref 5–17)
BUN SERPL-MCNC: 20 MG/DL — SIGNIFICANT CHANGE UP (ref 7–23)
CALCIUM SERPL-MCNC: 9.2 MG/DL — SIGNIFICANT CHANGE UP (ref 8.4–10.5)
CHLORIDE SERPL-SCNC: 96 MMOL/L — SIGNIFICANT CHANGE UP (ref 96–108)
CO2 SERPL-SCNC: 28 MMOL/L — SIGNIFICANT CHANGE UP (ref 22–31)
CREAT SERPL-MCNC: 1.14 MG/DL — SIGNIFICANT CHANGE UP (ref 0.5–1.3)
GLUCOSE SERPL-MCNC: 238 MG/DL — HIGH (ref 70–99)
HCT VFR BLD CALC: 32.8 % — LOW (ref 39–50)
HGB BLD-MCNC: 11.3 G/DL — LOW (ref 13–17)
MAGNESIUM SERPL-MCNC: 1.5 MG/DL — LOW (ref 1.6–2.6)
MCHC RBC-ENTMCNC: 28.8 PG — SIGNIFICANT CHANGE UP (ref 27–34)
MCHC RBC-ENTMCNC: 34.5 G/DL — SIGNIFICANT CHANGE UP (ref 32–36)
MCV RBC AUTO: 83.5 FL — SIGNIFICANT CHANGE UP (ref 80–100)
PLATELET # BLD AUTO: 186 K/UL — SIGNIFICANT CHANGE UP (ref 150–400)
POTASSIUM SERPL-MCNC: 4.3 MMOL/L — SIGNIFICANT CHANGE UP (ref 3.5–5.3)
POTASSIUM SERPL-SCNC: 4.3 MMOL/L — SIGNIFICANT CHANGE UP (ref 3.5–5.3)
RBC # BLD: 3.93 M/UL — LOW (ref 4.2–5.8)
RBC # FLD: 13 % — SIGNIFICANT CHANGE UP (ref 10.3–16.9)
SODIUM SERPL-SCNC: 136 MMOL/L — SIGNIFICANT CHANGE UP (ref 135–145)
WBC # BLD: 10 K/UL — SIGNIFICANT CHANGE UP (ref 3.8–10.5)
WBC # FLD AUTO: 10 K/UL — SIGNIFICANT CHANGE UP (ref 3.8–10.5)

## 2018-01-20 RX ORDER — CLOPIDOGREL BISULFATE 75 MG/1
75 TABLET, FILM COATED ORAL
Qty: 0 | Refills: 0 | COMMUNITY

## 2018-01-20 RX ORDER — LOSARTAN POTASSIUM 100 MG/1
1 TABLET, FILM COATED ORAL
Qty: 0 | Refills: 0 | COMMUNITY

## 2018-01-20 RX ORDER — METFORMIN HYDROCHLORIDE 850 MG/1
1 TABLET ORAL
Qty: 0 | Refills: 0 | COMMUNITY

## 2018-01-20 RX ORDER — ATORVASTATIN CALCIUM 80 MG/1
1 TABLET, FILM COATED ORAL
Qty: 0 | Refills: 0 | COMMUNITY

## 2018-01-20 RX ORDER — METOPROLOL TARTRATE 50 MG
1 TABLET ORAL
Qty: 0 | Refills: 0 | COMMUNITY

## 2018-01-20 RX ORDER — ASPIRIN/CALCIUM CARB/MAGNESIUM 324 MG
81 TABLET ORAL
Qty: 0 | Refills: 0 | COMMUNITY

## 2018-01-20 RX ORDER — MAGNESIUM SULFATE 500 MG/ML
1 VIAL (ML) INJECTION ONCE
Qty: 0 | Refills: 0 | Status: COMPLETED | OUTPATIENT
Start: 2018-01-20 | End: 2018-01-20

## 2018-01-20 RX ADMIN — LOSARTAN POTASSIUM 100 MILLIGRAM(S): 100 TABLET, FILM COATED ORAL at 06:05

## 2018-01-20 RX ADMIN — Medication 25 MILLIGRAM(S): at 06:05

## 2018-01-20 RX ADMIN — Medication 6: at 06:58

## 2018-01-20 RX ADMIN — Medication 100 GRAM(S): at 10:38

## 2018-01-20 RX ADMIN — Medication 10: at 12:10

## 2018-01-20 RX ADMIN — Medication 81 MILLIGRAM(S): at 10:38

## 2018-01-20 RX ADMIN — CLOPIDOGREL BISULFATE 75 MILLIGRAM(S): 75 TABLET, FILM COATED ORAL at 10:38

## 2018-01-20 RX ADMIN — Medication 100 MILLIGRAM(S): at 06:05

## 2018-01-20 NOTE — DISCHARGE NOTE ADULT - PATIENT PORTAL LINK FT
“You can access the FollowHealth Patient Portal, offered by [facility], by registering with the following website: [email or web address]”

## 2018-01-20 NOTE — DISCHARGE NOTE ADULT - PLAN OF CARE
Follow up with Dr. Rosales in 2 weeks You had cardiac catheterization procedure and Drug-eluting stents  to the RCA artery.   You need to take Aspirin and Plavix daily to keep the stent open.   Follow up with Dr. Rosales in 2 weeks  Wound care instruction: Avoid strenuous activities such as lifting, running, pushing or sex for 1 week in order to avoid bleeding complications in the groin and right wrist wounds.  If any questions about the groin, call us at [phone].  Ok to shower tonight. Avoid bathing for 1 week Low card diet  Restart Metformin on monday 1/22/18  Continue Victoza and Glipizide Low salt diet  Home meds

## 2018-01-20 NOTE — DISCHARGE NOTE ADULT - MEDICATION SUMMARY - MEDICATIONS TO TAKE
I will START or STAY ON the medications listed below when I get home from the hospital:    Aspirin Enteric Coated  -- 81 milligram(s) by mouth once a day  -- Indication: For Coronary artery disease / stent     losartan 100 mg oral tablet  -- 1 tab(s) by mouth once a day  -- Indication: For Hypertension     glipiZIDE 10 mg oral tablet  -- 1 tab(s) by mouth 2 times a day before meals  -- Indication: For Diabetes    Victoza 18 mg/3 mL subcutaneous solution  -- 1.8 milligram(s) subcutaneous once a day  -- Indication: For Diabetes    metFORMIN 1000 mg oral tablet  -- 1 tab(s) by mouth 2 times a day  * Restart on monday 1/22/18  -- Indication: For Diabetes    atorvastatin 40 mg oral tablet  -- 1 tab(s) by mouth once a day  -- Indication: For Cholesterol    clopidogrel  -- 75 milligram(s) by mouth once a day  -- Indication: For Coronary artery disease / stent     Metoprolol Tartrate 100 mg oral tablet  -- 1 tab(s) by mouth 2 times a day  -- Indication: For Coronary artery disease / hypertension    hydroCHLOROthiazide 25 mg oral tablet  -- 1 tab(s) by mouth once a day  -- Indication: For hypertension

## 2018-01-20 NOTE — DISCHARGE NOTE ADULT - HOSPITAL COURSE
58 y/o M, former smoker, with PMHx HTN, HLD, DM, CAD s/p multiple PCIs (most recently @[facility] 11/27/17 receiving HASEEB to proxLAD), who s/p staged PCI on 1/19/18. He had PTCA / orbital atherectomy and HASEEB x 3 to prox, mid and distal RCA.  EF 45% by prior stress echo in Nov 2017.  EKG post procedure with dynamic change (ST elevation) to V1-V3, asymptomatic. Dr. Vincent was aware.  Serial EKG today showed resolved ST changes.  VSS. telemetry with NSR and few PVC. His right radial and right fem arterial access sites are stable without hematoma / bleeding. He is stable for discharge.

## 2018-01-20 NOTE — DISCHARGE NOTE ADULT - CARE PROVIDERS DIRECT ADDRESSES
[email or web address]

## 2018-01-20 NOTE — DISCHARGE NOTE ADULT - CARE PLAN
Principal Discharge DX:	Coronary artery disease involving native coronary artery of native heart without angina pectoris  Goal:	Follow up with Dr. Rosales in 2 weeks  Assessment and plan of treatment:	You had cardiac catheterization procedure and Drug-eluting stents  to the RCA artery.   You need to take Aspirin and Plavix daily to keep the stent open.   Follow up with Dr. Rosales in 2 weeks  Wound care instruction: Avoid strenuous activities such as lifting, running, pushing or sex for 1 week in order to avoid bleeding complications in the groin and right wrist wounds.  If any questions about the groin, call us at [phone].  Ok to shower tonight. Avoid bathing for 1 week  Secondary Diagnosis:	Diabetes  Assessment and plan of treatment:	Low card diet  Restart Metformin on monday 1/22/18  Continue Victoza and Glipizide  Secondary Diagnosis:	Essential hypertension  Assessment and plan of treatment:	Low salt diet  Home meds

## 2018-01-20 NOTE — DISCHARGE NOTE ADULT - CARE PROVIDER_API CALL
Jeovanny Rosales (MD), Internal Medicine  [address] [place] [address]  Phone: [phone]  Fax: [phone]

## 2018-01-23 PROCEDURE — 82553 CREATINE MB FRACTION: CPT

## 2018-01-23 PROCEDURE — 82962 GLUCOSE BLOOD TEST: CPT

## 2018-01-23 PROCEDURE — 85610 PROTHROMBIN TIME: CPT

## 2018-01-23 PROCEDURE — 85025 COMPLETE CBC W/AUTO DIFF WBC: CPT

## 2018-01-23 PROCEDURE — C1769: CPT

## 2018-01-23 PROCEDURE — 83036 HEMOGLOBIN GLYCOSYLATED A1C: CPT

## 2018-01-23 PROCEDURE — 83735 ASSAY OF MAGNESIUM: CPT

## 2018-01-23 PROCEDURE — 82550 ASSAY OF CK (CPK): CPT

## 2018-01-23 PROCEDURE — 80048 BASIC METABOLIC PNL TOTAL CA: CPT

## 2018-01-23 PROCEDURE — C1894: CPT

## 2018-01-23 PROCEDURE — C1887: CPT

## 2018-01-23 PROCEDURE — C1889: CPT

## 2018-01-23 PROCEDURE — 36415 COLL VENOUS BLD VENIPUNCTURE: CPT

## 2018-01-23 PROCEDURE — C1725: CPT

## 2018-01-23 PROCEDURE — 85730 THROMBOPLASTIN TIME PARTIAL: CPT

## 2018-01-23 PROCEDURE — C1724: CPT

## 2018-01-23 PROCEDURE — C1760: CPT

## 2018-01-23 PROCEDURE — 80061 LIPID PANEL: CPT

## 2018-01-23 PROCEDURE — 80053 COMPREHEN METABOLIC PANEL: CPT

## 2018-01-23 PROCEDURE — 86140 C-REACTIVE PROTEIN: CPT

## 2018-01-23 PROCEDURE — C1874: CPT

## 2018-01-23 PROCEDURE — 85027 COMPLETE CBC AUTOMATED: CPT

## 2018-01-25 DIAGNOSIS — I25.10 ATHEROSCLEROTIC HEART DISEASE OF NATIVE CORONARY ARTERY WITHOUT ANGINA PECTORIS: ICD-10-CM

## 2018-01-25 DIAGNOSIS — Z79.84 LONG TERM (CURRENT) USE OF ORAL HYPOGLYCEMIC DRUGS: ICD-10-CM

## 2018-01-25 DIAGNOSIS — Z28.21 IMMUNIZATION NOT CARRIED OUT BECAUSE OF PATIENT REFUSAL: ICD-10-CM

## 2018-01-25 DIAGNOSIS — Z87.891 PERSONAL HISTORY OF NICOTINE DEPENDENCE: ICD-10-CM

## 2018-01-25 DIAGNOSIS — Z82.49 FAMILY HISTORY OF ISCHEMIC HEART DISEASE AND OTHER DISEASES OF THE CIRCULATORY SYSTEM: ICD-10-CM

## 2018-01-25 DIAGNOSIS — E78.5 HYPERLIPIDEMIA, UNSPECIFIED: ICD-10-CM

## 2018-01-25 DIAGNOSIS — Z79.82 LONG TERM (CURRENT) USE OF ASPIRIN: ICD-10-CM

## 2018-01-25 DIAGNOSIS — Z95.5 PRESENCE OF CORONARY ANGIOPLASTY IMPLANT AND GRAFT: ICD-10-CM

## 2018-01-25 DIAGNOSIS — I25.110 ATHEROSCLEROTIC HEART DISEASE OF NATIVE CORONARY ARTERY WITH UNSTABLE ANGINA PECTORIS: ICD-10-CM

## 2018-01-25 DIAGNOSIS — Z98.41 CATARACT EXTRACTION STATUS, RIGHT EYE: ICD-10-CM

## 2018-01-25 DIAGNOSIS — I10 ESSENTIAL (PRIMARY) HYPERTENSION: ICD-10-CM

## 2018-01-25 DIAGNOSIS — Z79.02 LONG TERM (CURRENT) USE OF ANTITHROMBOTICS/ANTIPLATELETS: ICD-10-CM

## 2018-01-25 DIAGNOSIS — Z98.42 CATARACT EXTRACTION STATUS, LEFT EYE: ICD-10-CM

## 2018-01-25 DIAGNOSIS — R94.31 ABNORMAL ELECTROCARDIOGRAM [ECG] [EKG]: ICD-10-CM

## 2018-01-25 DIAGNOSIS — E11.9 TYPE 2 DIABETES MELLITUS WITHOUT COMPLICATIONS: ICD-10-CM

## 2020-01-03 NOTE — PATIENT PROFILE ADULT. - AS SC BRADEN MOBILITY
Labs drawn sent for analysis.  Patient refused saline lock physician notified medicated for pain      Mahesh Ignacio RN  01/02/20 9130 (4) no limitation

## 2021-04-20 NOTE — PATIENT PROFILE ADULT. - IF HCP IS NOT ON CHART, IDENTIFY THE PERSONS NAME AND PHONE NUMBER CONTACTED TO BRING IN DOCUMENT
PATIENT IS REQUESTING ORDERS FOR A BONE DENSITY SCAN. PATIENT STATES SHE HAS ALWAYS DONE THIS TEST AT THE OFFICE IN THE PAST.    PLEASE ADVISE PATIENT WHAT SHE NEEDS TO DO: [phone]   Citlaly Tsang   [phone]

## 2023-04-19 PROBLEM — Z00.00 ENCOUNTER FOR PREVENTIVE HEALTH EXAMINATION: Status: ACTIVE | Noted: 2023-04-19

## 2025-06-02 PROBLEM — E78.5 HYPERLIPIDEMIA, UNSPECIFIED: Chronic | Status: ACTIVE | Noted: 2018-01-17

## 2025-06-03 ENCOUNTER — INPATIENT (INPATIENT)
Facility: HOSPITAL | Age: 65
LOS: 0 days | Discharge: HOME | End: 2025-06-04
Attending: INTERNAL MEDICINE | Admitting: INTERNAL MEDICINE
Payer: MEDICARE

## 2025-06-03 ENCOUNTER — RESULT REVIEW (OUTPATIENT)
Age: 65
End: 2025-06-03

## 2025-06-03 VITALS
HEIGHT: 71 IN | HEART RATE: 68 BPM | DIASTOLIC BLOOD PRESSURE: 65 MMHG | RESPIRATION RATE: 20 BRPM | OXYGEN SATURATION: 96 % | SYSTOLIC BLOOD PRESSURE: 141 MMHG | WEIGHT: 232.37 LBS

## 2025-06-03 DIAGNOSIS — Z95.5 PRESENCE OF CORONARY ANGIOPLASTY IMPLANT AND GRAFT: Chronic | ICD-10-CM

## 2025-06-03 DIAGNOSIS — E11.9 TYPE 2 DIABETES MELLITUS WITHOUT COMPLICATIONS: ICD-10-CM

## 2025-06-03 DIAGNOSIS — I25.10 ATHEROSCLEROTIC HEART DISEASE OF NATIVE CORONARY ARTERY WITHOUT ANGINA PECTORIS: ICD-10-CM

## 2025-06-03 DIAGNOSIS — Z98.49 CATARACT EXTRACTION STATUS, UNSPECIFIED EYE: Chronic | ICD-10-CM

## 2025-06-03 DIAGNOSIS — I10 ESSENTIAL (PRIMARY) HYPERTENSION: ICD-10-CM

## 2025-06-03 LAB
A1C WITH ESTIMATED AVERAGE GLUCOSE RESULT: 8.5 % — HIGH (ref 4–5.6)
ALBUMIN SERPL ELPH-MCNC: 3.2 G/DL — LOW (ref 3.3–5)
ALP SERPL-CCNC: 57 U/L — SIGNIFICANT CHANGE UP (ref 40–120)
ALT FLD-CCNC: 16 U/L — SIGNIFICANT CHANGE UP (ref 10–45)
ANION GAP SERPL CALC-SCNC: 10 MMOL/L — SIGNIFICANT CHANGE UP (ref 5–17)
APTT BLD: 33.5 SEC — SIGNIFICANT CHANGE UP (ref 26.1–36.8)
APTT BLD: 52.6 SEC — HIGH (ref 26.1–36.8)
AST SERPL-CCNC: 21 U/L — SIGNIFICANT CHANGE UP (ref 10–40)
BILIRUB SERPL-MCNC: 0.4 MG/DL — SIGNIFICANT CHANGE UP (ref 0.2–1.2)
BLD GP AB SCN SERPL QL: NEGATIVE — SIGNIFICANT CHANGE UP
BUN SERPL-MCNC: 27 MG/DL — HIGH (ref 7–23)
CALCIUM SERPL-MCNC: 8.2 MG/DL — LOW (ref 8.4–10.5)
CHLORIDE SERPL-SCNC: 108 MMOL/L — SIGNIFICANT CHANGE UP (ref 96–108)
CHOLEST SERPL-MCNC: 102 MG/DL — SIGNIFICANT CHANGE UP
CO2 SERPL-SCNC: 24 MMOL/L — SIGNIFICANT CHANGE UP (ref 22–31)
CREAT SERPL-MCNC: 1.47 MG/DL — HIGH (ref 0.5–1.3)
EGFR: 52 ML/MIN/1.73M2 — LOW
EGFR: 52 ML/MIN/1.73M2 — LOW
ESTIMATED AVERAGE GLUCOSE: 197 MG/DL — HIGH (ref 68–114)
GLUCOSE SERPL-MCNC: 125 MG/DL — HIGH (ref 70–99)
HCT VFR BLD CALC: 28.9 % — LOW (ref 39–50)
HDLC SERPL-MCNC: 29 MG/DL — LOW
HGB BLD-MCNC: 9.8 G/DL — LOW (ref 13–17)
INR BLD: 0.98 — SIGNIFICANT CHANGE UP (ref 0.85–1.16)
INR BLD: 1 — SIGNIFICANT CHANGE UP (ref 0.85–1.16)
LDLC SERPL-MCNC: 53 MG/DL — SIGNIFICANT CHANGE UP
LIPID PNL WITH DIRECT LDL SERPL: 53 MG/DL — SIGNIFICANT CHANGE UP
MAGNESIUM SERPL-MCNC: 1.1 MG/DL — LOW (ref 1.6–2.6)
MCHC RBC-ENTMCNC: 29.9 PG — SIGNIFICANT CHANGE UP (ref 27–34)
MCHC RBC-ENTMCNC: 33.9 G/DL — SIGNIFICANT CHANGE UP (ref 32–36)
MCV RBC AUTO: 88.1 FL — SIGNIFICANT CHANGE UP (ref 80–100)
NONHDLC SERPL-MCNC: 73 MG/DL — SIGNIFICANT CHANGE UP
NRBC BLD AUTO-RTO: 0 /100 WBCS — SIGNIFICANT CHANGE UP (ref 0–0)
PLATELET # BLD AUTO: 169 K/UL — SIGNIFICANT CHANGE UP (ref 150–400)
POTASSIUM SERPL-MCNC: 4.3 MMOL/L — SIGNIFICANT CHANGE UP (ref 3.5–5.3)
POTASSIUM SERPL-SCNC: 4.3 MMOL/L — SIGNIFICANT CHANGE UP (ref 3.5–5.3)
PROT SERPL-MCNC: 6.2 G/DL — SIGNIFICANT CHANGE UP (ref 6–8.3)
PROTHROM AB SERPL-ACNC: 11.5 SEC — SIGNIFICANT CHANGE UP (ref 9.9–13.4)
PROTHROM AB SERPL-ACNC: 11.5 SEC — SIGNIFICANT CHANGE UP (ref 9.9–13.4)
RBC # BLD: 3.28 M/UL — LOW (ref 4.2–5.8)
RBC # FLD: 12.8 % — SIGNIFICANT CHANGE UP (ref 10.3–14.5)
RH IG SCN BLD-IMP: POSITIVE — SIGNIFICANT CHANGE UP
SODIUM SERPL-SCNC: 142 MMOL/L — SIGNIFICANT CHANGE UP (ref 135–145)
TRIGL SERPL-MCNC: 103 MG/DL — SIGNIFICANT CHANGE UP
TROPONIN T, HIGH SENSITIVITY RESULT: 23 NG/L — SIGNIFICANT CHANGE UP (ref 0–51)
WBC # BLD: 7.96 K/UL — SIGNIFICANT CHANGE UP (ref 3.8–10.5)
WBC # FLD AUTO: 7.96 K/UL — SIGNIFICANT CHANGE UP (ref 3.8–10.5)

## 2025-06-03 PROCEDURE — 92978 ENDOLUMINL IVUS OCT C 1ST: CPT | Mod: 26,LM

## 2025-06-03 PROCEDURE — 99152 MOD SED SAME PHYS/QHP 5/>YRS: CPT

## 2025-06-03 PROCEDURE — 93010 ELECTROCARDIOGRAM REPORT: CPT

## 2025-06-03 PROCEDURE — 92928 PRQ TCAT PLMT NTRAC ST 1 LES: CPT | Mod: LM

## 2025-06-03 PROCEDURE — 92972 PERQ TRLUML CORONRY LITHOTRP: CPT

## 2025-06-03 PROCEDURE — 93306 TTE W/DOPPLER COMPLETE: CPT | Mod: 26

## 2025-06-03 PROCEDURE — 99223 1ST HOSP IP/OBS HIGH 75: CPT

## 2025-06-03 RX ORDER — DEXTROSE 50 % IN WATER 50 %
15 SYRINGE (ML) INTRAVENOUS ONCE
Refills: 0 | Status: DISCONTINUED | OUTPATIENT
Start: 2025-06-03 | End: 2025-06-04

## 2025-06-03 RX ORDER — ATORVASTATIN CALCIUM 80 MG/1
1 TABLET, FILM COATED ORAL
Refills: 0 | DISCHARGE

## 2025-06-03 RX ORDER — SODIUM CHLORIDE 9 G/1000ML
1000 INJECTION, SOLUTION INTRAVENOUS
Refills: 0 | Status: DISCONTINUED | OUTPATIENT
Start: 2025-06-03 | End: 2025-06-04

## 2025-06-03 RX ORDER — ASPIRIN 325 MG
81 TABLET ORAL DAILY
Refills: 0 | Status: DISCONTINUED | OUTPATIENT
Start: 2025-06-03 | End: 2025-06-04

## 2025-06-03 RX ORDER — METOPROLOL SUCCINATE 50 MG/1
100 TABLET, EXTENDED RELEASE ORAL
Refills: 0 | Status: DISCONTINUED | OUTPATIENT
Start: 2025-06-03 | End: 2025-06-04

## 2025-06-03 RX ORDER — DEXTROSE 50 % IN WATER 50 %
25 SYRINGE (ML) INTRAVENOUS ONCE
Refills: 0 | Status: DISCONTINUED | OUTPATIENT
Start: 2025-06-03 | End: 2025-06-04

## 2025-06-03 RX ORDER — ATORVASTATIN CALCIUM 80 MG/1
80 TABLET, FILM COATED ORAL AT BEDTIME
Refills: 0 | Status: DISCONTINUED | OUTPATIENT
Start: 2025-06-03 | End: 2025-06-04

## 2025-06-03 RX ORDER — INSULIN LISPRO 100 U/ML
INJECTION, SOLUTION INTRAVENOUS; SUBCUTANEOUS EVERY 6 HOURS
Refills: 0 | Status: DISCONTINUED | OUTPATIENT
Start: 2025-06-03 | End: 2025-06-04

## 2025-06-03 RX ORDER — CLOPIDOGREL BISULFATE 75 MG/1
75 TABLET, FILM COATED ORAL DAILY
Refills: 0 | Status: DISCONTINUED | OUTPATIENT
Start: 2025-06-03 | End: 2025-06-04

## 2025-06-03 RX ORDER — GLUCAGON 3 MG/1
1 POWDER NASAL ONCE
Refills: 0 | Status: DISCONTINUED | OUTPATIENT
Start: 2025-06-03 | End: 2025-06-04

## 2025-06-03 RX ORDER — SEMAGLUTIDE 1 MG/.5ML
1 INJECTION, SOLUTION SUBCUTANEOUS
Refills: 0 | DISCHARGE

## 2025-06-03 RX ORDER — HYDROCHLOROTHIAZIDE 50 MG/1
1 TABLET ORAL
Refills: 0 | DISCHARGE

## 2025-06-03 RX ORDER — HEPARIN SODIUM 1000 [USP'U]/ML
INJECTION INTRAVENOUS; SUBCUTANEOUS
Qty: 25000 | Refills: 0 | Status: DISCONTINUED | OUTPATIENT
Start: 2025-06-03 | End: 2025-06-03

## 2025-06-03 RX ORDER — DEXTROSE 50 % IN WATER 50 %
12.5 SYRINGE (ML) INTRAVENOUS ONCE
Refills: 0 | Status: DISCONTINUED | OUTPATIENT
Start: 2025-06-03 | End: 2025-06-04

## 2025-06-03 RX ADMIN — Medication 81 MILLIGRAM(S): at 11:11

## 2025-06-03 RX ADMIN — CLOPIDOGREL BISULFATE 75 MILLIGRAM(S): 75 TABLET, FILM COATED ORAL at 11:12

## 2025-06-03 RX ADMIN — METOPROLOL SUCCINATE 100 MILLIGRAM(S): 50 TABLET, EXTENDED RELEASE ORAL at 06:42

## 2025-06-03 RX ADMIN — Medication 75 MILLILITER(S): at 02:19

## 2025-06-03 RX ADMIN — Medication 200 MILLILITER(S): at 19:46

## 2025-06-03 RX ADMIN — ATORVASTATIN CALCIUM 80 MILLIGRAM(S): 80 TABLET, FILM COATED ORAL at 23:35

## 2025-06-03 RX ADMIN — HEPARIN SODIUM 1000 UNIT(S)/HR: 1000 INJECTION INTRAVENOUS; SUBCUTANEOUS at 02:18

## 2025-06-03 RX ADMIN — INSULIN LISPRO 1: 100 INJECTION, SOLUTION INTRAVENOUS; SUBCUTANEOUS at 11:12

## 2025-06-03 RX ADMIN — METOPROLOL SUCCINATE 100 MILLIGRAM(S): 50 TABLET, EXTENDED RELEASE ORAL at 23:36

## 2025-06-03 NOTE — H&P ADULT - PROBLEM SELECTOR PLAN 2
SBP stable   -holding home Losartan and HCTZ given double contrast load   - will hydrate with precath fluids overnight--- resume Losartan and HCTZ post cath   -c/w home lopressor 100 mg BID

## 2025-06-03 NOTE — PATIENT PROFILE ADULT - STATED REASON FOR ADMISSION
[facility] didn't have the equipment needed for cardiac cath procedure and they will do it here at [place] today

## 2025-06-03 NOTE — H&P ADULT - NSICDXFAMILYHX_GEN_ALL_CORE_FT
FAMILY HISTORY:  Father  Still living? No  Family history of acute myocardial infarction, Age at diagnosis: 51-60

## 2025-06-03 NOTE — H&P ADULT - PROBLEM SELECTOR PLAN 4
Home regimen: Rybelsus 14mg, Metformin 1000 mg BID and Glipizide 10mg BID   -c/w mISS     F: None  E: Replete if K<4 or Mag<2  N: DASH Diet  GIppx: none   VTEppx: heparin gtt   Code: full   Dispo: pending staged PCI

## 2025-06-03 NOTE — H&P ADULT - ASSESSMENT
64 YOM, +family hx of CAD (father w/ MI in 50s), former smoker, PMHx of CAD ( IWMI in 1996s/p balloon angioplasty, and multiple subsequent PCIs, most recently HASEEB to pLAD 11/2017 and staged PCI (HASEEB x3) to prox/mid/distal RCA 1/2018 at [facility],), HTN, HLD, DM2, obesity who initially presented to [facility] for scheduled LHC after an abnormal stress echo. Patient underwent diagnostic LHC on 6/2/25 revealing 80% dLM, and  LCx. Patient is now transferred to [facility] for complex revascularization of left main and possible LCx .    64 YOM, +family hx of CAD (father w/ MI in 50s), former smoker, PMHx of CAD ( IWMI in 1996s/p balloon angioplasty, and multiple subsequent PCIs, most recently HASEEB to pLAD 11/2017 and staged PCI (HASEEB x3) to prox/mid/distal RCA 1/2018 at [facility],), HTN, HLD, DM2, obesity who initially presented to [facility] for scheduled LHC after an abnormal stress echo. Patient underwent diagnostic LHC on 6/2/25 revealing 80% dLM, and  LCx. Patient is now transferred to [facility] for complex revascularization of left main and possible LCx .     		  ASA III	Mallampati class: II      Anginal Class: N/A     -No Known Allergies      Sedation Plan:   Moderate  Patient Is Suitable Candidate For Sedation?     Yes    Risks & benefits of procedure and alternative therapy have been explained to the patient by the Interventional Cardiology Fellow including but not limited to: allergic reaction, bleeding with possible need for blood transfusion, infection, renal and vascular compromise, limb damage, arrhythmia, stroke, vessel dissection/perforation, myocardial infarction, and emergent CABG. Informed consent obtained at bedside and included in chart.

## 2025-06-03 NOTE — H&P ADULT - CARDIOVASCULAR
right radial artery dressing C/D/I/normal/regular rate and rhythm/S1 S2 present/no gallops/no rub/no murmur/vascular

## 2025-06-03 NOTE — H&P ADULT - NSICDXPASTSURGICALHX_GEN_ALL_CORE_FT
PAST SURGICAL HISTORY:  History of cataract extraction, unspecified laterality B/L in 2014    Presence of stent in coronary artery x2 in 2003

## 2025-06-03 NOTE — PROGRESS NOTE ADULT - SUBJECTIVE AND OBJECTIVE BOX
Cardiology NP Adult Progress Note    ***INCOMPLETE     SUBJECTIVE ASSESSMENT:  	  MEDICATIONS:  metoprolol tartrate 100 milliGRAM(s) Oral two times a day  atorvastatin 80 milliGRAM(s) Oral at bedtime  dextrose 50% Injectable 25 Gram(s) IV Push once  dextrose 50% Injectable 12.5 Gram(s) IV Push once  dextrose 50% Injectable 25 Gram(s) IV Push once  dextrose Oral Gel 15 Gram(s) Oral once PRN  glucagon  Injectable 1 milliGRAM(s) IntraMuscular once  insulin lispro (ADMELOG) corrective regimen sliding scale   SubCutaneous every 6 hours  aspirin enteric coated 81 milliGRAM(s) Oral daily  clopidogrel Tablet 75 milliGRAM(s) Oral daily  dextrose 5%. 1000 milliLiter(s) IV Continuous <Continuous>  dextrose 5%. 1000 milliLiter(s) IV Continuous <Continuous>  heparin  Infusion.  Unit(s)/Hr IV Continuous <Continuous>  sodium chloride 0.9%. 1000 milliLiter(s) IV Continuous <Continuous>    VITAL SIGNS:  T(C): 36.5 (06-03-25 @ 05:50), Max: 36.5 (06-03-25 @ 05:50)  HR: 58 (06-03-25 @ 08:30) (58 - 68)  BP: 143/68 (06-03-25 @ 08:30) (134/62 - 143/68)  RR: 15 (06-03-25 @ 08:30) (15 - 20)  SpO2: 96% (06-03-25 @ 08:30) (95% - 96%)  Wt(kg): --    I&O's Summary    02 Jun 2025 07:01  -  03 Jun 2025 07:00  --------------------------------------------------------  IN: 425 mL / OUT: 0 mL / NET: 425 mL    03 Jun 2025 07:01  -  03 Jun 2025 08:44  --------------------------------------------------------  IN: 170 mL / OUT: 0 mL / NET: 170 mL    Height (cm): 180.3 (06-03 @ 01:25)  Weight (kg): 105.415 (06-03 @ 01:25)  BMI (kg/m2): 32.4 (06-03 @ 01:25)  BSA (m2): 2.25 (06-03 @ 01:25)                                     PHYSICAL EXAM:  Appearance: Normal	  HEENT: Normal oral mucosa, PERRL, EOMI	  Neck: Supple, + JVD/ - JVD; ___ Carotid Bruit   Cardiovascular: Normal S1 S2, No murmurs  Respiratory: Lungs clear to auscultation/Decreased Breath Sounds/No Rales, Rhonchi, Wheezing	  Gastrointestinal:  Soft, Non-tender, + BS	  Skin: No rashes, No ecchymoses, No cyanosis  Extremities: Normal range of motion, No clubbing, cyanosis or edema  Vascular: Peripheral pulses palpable 2+ bilaterally  Neurologic: Non-focal  Psychiatry: A & O x 3, Mood & affect appropriate    LABS:	 	             9.8    7.96  )-----------( 169      ( 03 Jun 2025 05:58 )             28.9     06-03    142  |  108  |  27[H]  ----------------------------<  125[H]  4.3   |  24  |  1.47[H]    Ca    8.2[L]      03 Jun 2025 05:58  Mg     1.1     06-03    TPro  6.2  /  Alb  3.2[L]  /  TBili  0.4  /  DBili  x   /  AST  21  /  ALT  16  /  AlkPhos  57  06-03    proBNP:   Lipid Profile:   HgA1c:   TSH:   PT/INR - ( 03 Jun 2025 01:18 )   PT: 11.5 sec;   INR: 1.00          PTT - ( 03 Jun 2025 01:18 )  PTT:33.5 sec

## 2025-06-03 NOTE — H&P ADULT - NSHPLABSRESULTS_GEN_ALL_CORE
PT/INR - ( 03 Jun 2025 01:18 )   PT: 11.5 sec;   INR: 1.00          PTT - ( 03 Jun 2025 01:18 )  PTT:33.5 sec              EKG:

## 2025-06-03 NOTE — H&P ADULT - NS ATTEND AMEND GEN_ALL_CORE FT
64 YOM, +family hx of CAD (father w/ MI in 50s), former smoker, PMHx of CAD ( IWMI in 1996s/p balloon angioplasty, and multiple subsequent PCIs, most recently HASEEB to pLAD 11/2017 and staged PCI (HASEEB x3) to prox/mid/distal RCA 1/2018 at [facility],), HTN, HLD, DM2, obesity who initially presented to [facility] for scheduled LHC after an abnormal stress echo. Patient underwent diagnostic LHC on 6/2/25 revealing 80% dLM, and  LCx. Patient is now transferred to [facility] for complex revascularization of left main and possible LCx .     1. CAD  Complex CAD, LM 80% and LCX , tx to [facility] for complex PCI. Continue heparin drip.

## 2025-06-03 NOTE — H&P ADULT - NSICDXPASTMEDICALHX_GEN_ALL_CORE_FT
PAST MEDICAL HISTORY:  CAD (coronary artery disease)     Diabetes     HLD (hyperlipidemia)     Hypertension, unspecified type

## 2025-06-03 NOTE — PATIENT PROFILE ADULT - FALL HARM RISK - RISK INTERVENTIONS
Assistance with ambulation/Communicate Fall Risk and Risk Factors to all staff, patient, and family/Reinforce activity limits and safety measures with patient and family/Visual Cue: Yellow wristband/Bed in lowest position, wheels locked, appropriate side rails in place/Call bell, personal items and telephone in reach/Instruct patient to call for assistance before getting out of bed or chair/Non-slip footwear when patient is out of bed/[place] to call system/Physically safe environment - no spills, clutter or unnecessary equipment/Purposeful Proactive Rounding/Room/bathroom lighting operational, light cord in reach

## 2025-06-03 NOTE — H&P ADULT - HISTORY OF PRESENT ILLNESS
64 YOM, +family hx of CAD (father w/ MI in 50s), former smoker, PMHx of CAD ( IWMI in 1996s/p balloon angioplasty, and multiple subsequent PCIs, most recently HASEEB to pLAD 11/2017 and staged PCI (HASEEB x3) to prox/mid/distal RCA 1/2018 at [facility],), HTN, HLD, DM2, obesity who initially presented to [facility] for scheduled LHC after an abnormal stress echo. Patient underwent diagnostic LHC on 6/2/25 revealing 80% dLM, and  LCx. Patient is now transferred to [facility] for complex revascularization of left main and possible LCx .     Cardiac History:   --Stress TTE (5/25/21): The estimated baseline EF was 50%.  Mild MR at rest, no change with exercise.  There is no evidence of previous infarction. The ejection fraction increased with stress.  --LHC at [facility] with Dr. Kwame Bang (6/2/25): LM: 80% distal stenosis, LAD: Patent stent in proximal segment, 30% ISR noted, LCx: 100% ostial stenosis, fills via faint right to left collaterals, RCA: Dominant vessel with patent stents in proximal, mid and distal segments  RPDA: Diffusely diseased vessel, LVEDP: 11 mmHg, no aortic stenosis  Access RRA with TR Band  --LHC at [facility] with Dr. Vincent (1/19/2018): PTCA / orbital atherectomy and HASEEB x 3 to prox, mid and distal RCA.  --LHC at [facility] (11/27/17): HASEEB to proxLAD

## 2025-06-04 ENCOUNTER — TRANSCRIPTION ENCOUNTER (OUTPATIENT)
Age: 65
End: 2025-06-04

## 2025-06-04 VITALS — TEMPERATURE: 98 F

## 2025-06-04 LAB
ALBUMIN SERPL ELPH-MCNC: 3.8 G/DL — SIGNIFICANT CHANGE UP (ref 3.3–5)
ALP SERPL-CCNC: 60 U/L — SIGNIFICANT CHANGE UP (ref 40–120)
ALT FLD-CCNC: 16 U/L — SIGNIFICANT CHANGE UP (ref 10–45)
ANION GAP SERPL CALC-SCNC: 14 MMOL/L — SIGNIFICANT CHANGE UP (ref 5–17)
AST SERPL-CCNC: 26 U/L — SIGNIFICANT CHANGE UP (ref 10–40)
BILIRUB SERPL-MCNC: 0.5 MG/DL — SIGNIFICANT CHANGE UP (ref 0.2–1.2)
BUN SERPL-MCNC: 24 MG/DL — HIGH (ref 7–23)
CALCIUM SERPL-MCNC: 8.8 MG/DL — SIGNIFICANT CHANGE UP (ref 8.4–10.5)
CHLORIDE SERPL-SCNC: 105 MMOL/L — SIGNIFICANT CHANGE UP (ref 96–108)
CO2 SERPL-SCNC: 22 MMOL/L — SIGNIFICANT CHANGE UP (ref 22–31)
CREAT SERPL-MCNC: 1.47 MG/DL — HIGH (ref 0.5–1.3)
EGFR: 52 ML/MIN/1.73M2 — LOW
EGFR: 52 ML/MIN/1.73M2 — LOW
GLUCOSE SERPL-MCNC: 183 MG/DL — HIGH (ref 70–99)
HCT VFR BLD CALC: 30.7 % — LOW (ref 39–50)
HGB BLD-MCNC: 10.3 G/DL — LOW (ref 13–17)
MAGNESIUM SERPL-MCNC: 1.2 MG/DL — LOW (ref 1.6–2.6)
MCHC RBC-ENTMCNC: 30.4 PG — SIGNIFICANT CHANGE UP (ref 27–34)
MCHC RBC-ENTMCNC: 33.6 G/DL — SIGNIFICANT CHANGE UP (ref 32–36)
MCV RBC AUTO: 90.6 FL — SIGNIFICANT CHANGE UP (ref 80–100)
NRBC BLD AUTO-RTO: 0 /100 WBCS — SIGNIFICANT CHANGE UP (ref 0–0)
PLATELET # BLD AUTO: 167 K/UL — SIGNIFICANT CHANGE UP (ref 150–400)
POTASSIUM SERPL-MCNC: 4.1 MMOL/L — SIGNIFICANT CHANGE UP (ref 3.5–5.3)
POTASSIUM SERPL-SCNC: 4.1 MMOL/L — SIGNIFICANT CHANGE UP (ref 3.5–5.3)
PROT SERPL-MCNC: 6.5 G/DL — SIGNIFICANT CHANGE UP (ref 6–8.3)
RBC # BLD: 3.39 M/UL — LOW (ref 4.2–5.8)
RBC # FLD: 12.6 % — SIGNIFICANT CHANGE UP (ref 10.3–14.5)
SODIUM SERPL-SCNC: 141 MMOL/L — SIGNIFICANT CHANGE UP (ref 135–145)
WBC # BLD: 9.22 K/UL — SIGNIFICANT CHANGE UP (ref 3.8–10.5)
WBC # FLD AUTO: 9.22 K/UL — SIGNIFICANT CHANGE UP (ref 3.8–10.5)

## 2025-06-04 PROCEDURE — 99239 HOSP IP/OBS DSCHRG MGMT >30: CPT

## 2025-06-04 PROCEDURE — 85027 COMPLETE CBC AUTOMATED: CPT

## 2025-06-04 PROCEDURE — C1725: CPT

## 2025-06-04 PROCEDURE — C1769: CPT

## 2025-06-04 PROCEDURE — 36415 COLL VENOUS BLD VENIPUNCTURE: CPT

## 2025-06-04 PROCEDURE — 85610 PROTHROMBIN TIME: CPT

## 2025-06-04 PROCEDURE — 93010 ELECTROCARDIOGRAM REPORT: CPT

## 2025-06-04 PROCEDURE — 86900 BLOOD TYPING SEROLOGIC ABO: CPT

## 2025-06-04 PROCEDURE — C1761: CPT

## 2025-06-04 PROCEDURE — C1874: CPT

## 2025-06-04 PROCEDURE — 82962 GLUCOSE BLOOD TEST: CPT

## 2025-06-04 PROCEDURE — 86850 RBC ANTIBODY SCREEN: CPT

## 2025-06-04 PROCEDURE — 83735 ASSAY OF MAGNESIUM: CPT

## 2025-06-04 PROCEDURE — C1887: CPT

## 2025-06-04 PROCEDURE — 85730 THROMBOPLASTIN TIME PARTIAL: CPT

## 2025-06-04 PROCEDURE — 80053 COMPREHEN METABOLIC PANEL: CPT

## 2025-06-04 PROCEDURE — C1753: CPT

## 2025-06-04 PROCEDURE — 80061 LIPID PANEL: CPT

## 2025-06-04 PROCEDURE — 93005 ELECTROCARDIOGRAM TRACING: CPT

## 2025-06-04 PROCEDURE — 84484 ASSAY OF TROPONIN QUANT: CPT

## 2025-06-04 PROCEDURE — 83036 HEMOGLOBIN GLYCOSYLATED A1C: CPT

## 2025-06-04 PROCEDURE — 86901 BLOOD TYPING SEROLOGIC RH(D): CPT

## 2025-06-04 PROCEDURE — 93306 TTE W/DOPPLER COMPLETE: CPT

## 2025-06-04 PROCEDURE — C1894: CPT

## 2025-06-04 RX ORDER — ASPIRIN 325 MG
1 TABLET ORAL
Qty: 30 | Refills: 11
Start: 2025-06-04 | End: 2026-05-29

## 2025-06-04 RX ORDER — MAGNESIUM SULFATE 500 MG/ML
2 SYRINGE (ML) INJECTION ONCE
Refills: 0 | Status: COMPLETED | OUTPATIENT
Start: 2025-06-04 | End: 2025-06-04

## 2025-06-04 RX ORDER — CLOPIDOGREL BISULFATE 75 MG/1
1 TABLET, FILM COATED ORAL
Qty: 30 | Refills: 11
Start: 2025-06-04 | End: 2026-05-29

## 2025-06-04 RX ADMIN — INSULIN LISPRO 1: 100 INJECTION, SOLUTION INTRAVENOUS; SUBCUTANEOUS at 00:35

## 2025-06-04 RX ADMIN — INSULIN LISPRO 1: 100 INJECTION, SOLUTION INTRAVENOUS; SUBCUTANEOUS at 07:17

## 2025-06-04 RX ADMIN — CLOPIDOGREL BISULFATE 75 MILLIGRAM(S): 75 TABLET, FILM COATED ORAL at 09:52

## 2025-06-04 RX ADMIN — METOPROLOL SUCCINATE 100 MILLIGRAM(S): 50 TABLET, EXTENDED RELEASE ORAL at 05:07

## 2025-06-04 RX ADMIN — Medication 25 GRAM(S): at 11:24

## 2025-06-04 RX ADMIN — Medication 81 MILLIGRAM(S): at 09:52

## 2025-06-04 NOTE — DISCHARGE NOTE NURSING/CASE MANAGEMENT/SOCIAL WORK - NSDCPEFALRISK_GEN_ALL_CORE
For information on Fall & Injury Prevention, visit: [email or web address] OR  [email or web address] OR  https://www.cdc.gov/steadi/patient.html

## 2025-06-04 NOTE — DISCHARGE NOTE PROVIDER - CARE PROVIDERS DIRECT ADDRESSES
[email or web address]

## 2025-06-04 NOTE — DISCHARGE NOTE NURSING/CASE MANAGEMENT/SOCIAL WORK - FINANCIAL ASSISTANCE
[facility] provides services at a reduced cost to those who are determined to be eligible through [facility]’s financial assistance program. Information regarding [facility]’s financial assistance program can be found by going to [email or web address] or by calling [phone].

## 2025-06-04 NOTE — DISCHARGE NOTE PROVIDER - CARE PROVIDER_API CALL
Jeovanny Rosales  Cardiovascular Disease  [address] [place] [address]  Phone: [phone]  Fax: [phone]  Follow Up Time: 2 weeks

## 2025-06-04 NOTE — DISCHARGE NOTE PROVIDER - NSDCCPCAREPLAN_GEN_ALL_CORE_FT
PRINCIPAL DISCHARGE DIAGNOSIS  Diagnosis: CAD (coronary artery disease)  Assessment and Plan of Treatment: You underwent a cardiac angiogram and received a stent to the distal left main artery. PLEASE CONTINUE ASPIRIN 81MG DAILY AND PLAVIX 75MG DAILY. DO NOT STOP THESE MEDICATIONS FOR ANY REASON AS THEY ARE KEEPING YOUR STENT OPEN AND PREVENTING A HEART ATTACK. Avoid strenuous activity or heavy lifting for the next five days. Do not take a bath or swim for the next five days; you may shower. For any bleeding or hematoma formation (hardened blood collection under the skin) at the access site of RIGHT WRIST please hold pressure and go to the emergency room. Please follow up with Dr. Rosales in 1-2 weeks. For recurrent chest pain, please call your doctor or go to the emergency room.      SECONDARY DISCHARGE DIAGNOSES  Diagnosis: HTN (hypertension)  Assessment and Plan of Treatment: *HTN    Diagnosis: HLD (hyperlipidemia)  Assessment and Plan of Treatment: Please continue Atorvastatin 80mg once daily to keep your cholesterol low. High cholesterol contributes to heart disease.    Diagnosis: DM (diabetes mellitus)  Assessment and Plan of Treatment: Please continue medications as listed for diabetes. Maintain a low carbohydrate, low sugar diet. Check for your blood sugars regularly.

## 2025-06-04 NOTE — DISCHARGE NOTE PROVIDER - ATTENDING DISCHARGE PHYSICAL EXAMINATION:
CAD  [facility] 6/3/25: Shockwave/PCTA/HASEEB x 1 dLM 80%-> pLAD, prox LAD stent patent, small ramus with mod diffuse, LCx  with R->L collateral.   Follow up with outpatient cardiologist, Dr. Rosales, within 1-2 weeks of discharge.

## 2025-06-04 NOTE — DISCHARGE NOTE NURSING/CASE MANAGEMENT/SOCIAL WORK - PATIENT PORTAL LINK FT
You can access the FollowMyHealth Patient Portal offered by [facility] by registering at the following website: [email or web address]. By joining [facility]’s FollowMyHealth portal, you will also be able to view your health information using other applications (apps) compatible with our system.

## 2025-06-04 NOTE — DISCHARGE NOTE PROVIDER - HOSPITAL COURSE
64 YOM, +family hx of CAD (father w/ MI in 50s), former smoker, PMHx of CAD ( IWMI in 1996s/p balloon angioplasty, and multiple subsequent PCIs, most recently HASEEB to pLAD 11/2017 and staged PCI (HASEEB x3) to prox/mid/distal RCA 1/2018 at [facility],), HTN, HLD, DM2, obesity who initially presented to [facility] for scheduled LHC after an abnormal stress echo. Patient underwent diagnostic LHC on 6/2/25 revealing 80% dLM, and  LCx. Patient is now transferred to [facility] for complex revascularization of left main and possible LCx . Now s/p LHC 6/3/25: Shockwave/PCTA/HASEEB x 1 dLM 80%-> pLAD, prox LAD stent patent, small ramus with mod diffuse, LCx  with R->L collateral. ACCESS: R radial artery.     Pt evaluated prior to discharge. VSS, labs unremarkable, no events on telemetry, and physical exam benign with right radial access site stable without hematoma/bleeding. Hospital course reviewed with attending cardiologist and patient deemed stable for discharge home. Pt to follow up with outpatient cardiologist, Dr. Rosales, within 1-2 weeks of discharge. GLP-1 receptor agonist/SGLT2 inhibitor meds discussed w/ patients and encouraged to discuss further with PMD or Endocrinologist at next visit. Pt discharge copies detail cardiovascular history, medications, testing/treatments. DAPT: ASA/Plavix. STATIN: Atorvastatin 80mg PO QHS. CARDIAC REHAB prescription not provided to patient given residual CAD. Prescriptions sent to patient's preferred pharmacy and discharge instructions reviewed with patient. Patient provided phone number to contact cardiac team with any questions or concerns.

## 2025-06-04 NOTE — DISCHARGE NOTE PROVIDER - NSDCMRMEDTOKEN_GEN_ALL_CORE_FT
Aspirin EC 81 mg oral delayed release tablet: 1 tab(s) orally once a day  clopidogrel 75 mg oral tablet: 1 tab(s) orally once a day  glipiZIDE 10 mg oral tablet: 1 tab(s) orally 2 times a day before meals  hydroCHLOROthiazide 25 mg oral tablet: 1 tab(s) orally once a day  Lipitor 80 mg oral tablet: 1 tab(s) orally once a day  losartan 100 mg oral tablet: 1 tab(s) orally once a day  metFORMIN 1000 mg oral tablet: 1 tab(s) orally 2 times a day * Restart on monday 1/22/18  Metoprolol Tartrate 100 mg oral tablet: 1 tab(s) orally 2 times a day  pantoprazole 40 mg oral delayed release tablet: 1 tab(s) orally once a day  Rybelsus 14 mg oral tablet: 1 tab(s) orally once a day

## 2025-06-10 DIAGNOSIS — E66.9 OBESITY, UNSPECIFIED: ICD-10-CM

## 2025-06-10 DIAGNOSIS — Z87.891 PERSONAL HISTORY OF NICOTINE DEPENDENCE: ICD-10-CM

## 2025-06-10 DIAGNOSIS — I10 ESSENTIAL (PRIMARY) HYPERTENSION: ICD-10-CM

## 2025-06-10 DIAGNOSIS — Z79.84 LONG TERM (CURRENT) USE OF ORAL HYPOGLYCEMIC DRUGS: ICD-10-CM

## 2025-06-10 DIAGNOSIS — I25.2 OLD MYOCARDIAL INFARCTION: ICD-10-CM

## 2025-06-10 DIAGNOSIS — Z79.02 LONG TERM (CURRENT) USE OF ANTITHROMBOTICS/ANTIPLATELETS: ICD-10-CM

## 2025-06-10 DIAGNOSIS — I25.10 ATHEROSCLEROTIC HEART DISEASE OF NATIVE CORONARY ARTERY WITHOUT ANGINA PECTORIS: ICD-10-CM

## 2025-06-10 DIAGNOSIS — Z82.49 FAMILY HISTORY OF ISCHEMIC HEART DISEASE AND OTHER DISEASES OF THE CIRCULATORY SYSTEM: ICD-10-CM

## 2025-06-10 DIAGNOSIS — Z95.5 PRESENCE OF CORONARY ANGIOPLASTY IMPLANT AND GRAFT: ICD-10-CM

## 2025-06-10 DIAGNOSIS — Z79.82 LONG TERM (CURRENT) USE OF ASPIRIN: ICD-10-CM

## 2025-06-10 DIAGNOSIS — E78.5 HYPERLIPIDEMIA, UNSPECIFIED: ICD-10-CM

## 2025-06-10 DIAGNOSIS — E11.9 TYPE 2 DIABETES MELLITUS WITHOUT COMPLICATIONS: ICD-10-CM
